# Patient Record
Sex: FEMALE | Race: WHITE | URBAN - METROPOLITAN AREA
[De-identification: names, ages, dates, MRNs, and addresses within clinical notes are randomized per-mention and may not be internally consistent; named-entity substitution may affect disease eponyms.]

---

## 2022-04-06 ENCOUNTER — WEB ENCOUNTER (OUTPATIENT)
Dept: URBAN - METROPOLITAN AREA CLINIC 105 | Facility: CLINIC | Age: 82
End: 2022-04-06

## 2022-04-06 ENCOUNTER — DASHBOARD ENCOUNTERS (OUTPATIENT)
Age: 82
End: 2022-04-06

## 2022-04-06 ENCOUNTER — OFFICE VISIT (OUTPATIENT)
Dept: URBAN - METROPOLITAN AREA CLINIC 105 | Facility: CLINIC | Age: 82
End: 2022-04-06
Payer: MEDICARE

## 2022-04-06 VITALS
BODY MASS INDEX: 20.32 KG/M2 | TEMPERATURE: 97.2 F | SYSTOLIC BLOOD PRESSURE: 180 MMHG | WEIGHT: 119 LBS | HEART RATE: 87 BPM | DIASTOLIC BLOOD PRESSURE: 76 MMHG | HEIGHT: 64 IN

## 2022-04-06 DIAGNOSIS — R19.7 DIARRHEA, UNSPECIFIED TYPE: ICD-10-CM

## 2022-04-06 PROCEDURE — 99204 OFFICE O/P NEW MOD 45 MIN: CPT | Performed by: INTERNAL MEDICINE

## 2022-04-06 NOTE — HPI-TODAY'S VISIT:
The patient presents for diarrhea  Today, the patient reports onset of diarrhea 1.5 weeks ago a few hours after eating a [employer]. She denies nausea/vomiting. She has 5-6 watery BMs QD without blood. She denies nocturnal urgency. She has abdominal pain shortly before a BM. She started on metronidazole on  without a change in symptoms. She denies recent abx use prior to taking metronidazole. She says her last colon was at least 10 years ago with Dr. Gloria - [facility].  PMH: Broke arm 1.5 years ago (surgery done), broke foot and ankle in  (surgery done), hyperlipidemia, seasonal allergies, vitamin B12 deficiency, vitamin D deficiency, neuropathy, arthritis, bunions, hammertoe PSH: tonsillectomy, eye surgery, varicose vein surgery SH: 1-2 glasses of wine QD, former tobacco user, CBD gummies FH: Mother - tongue cancer, father and brother  young, so she is unsure

## 2022-04-07 ENCOUNTER — LAB OUTSIDE AN ENCOUNTER (OUTPATIENT)
Dept: URBAN - METROPOLITAN AREA CLINIC 105 | Facility: CLINIC | Age: 82
End: 2022-04-07

## 2022-04-08 LAB
A/G RATIO: 1.9
ALBUMIN: 4.6
ALKALINE PHOSPHATASE: 77
ALT (SGPT): 13
AST (SGOT): 25
BASO (ABSOLUTE): 0.1
BASOS: 1
BILIRUBIN, TOTAL: 0.2
BUN/CREATININE RATIO: 20
BUN: 22
CALCIUM: 10.2
CARBON DIOXIDE, TOTAL: 21
CHLORIDE: 106
CREATININE: 1.11
EGFR: 49
EOS (ABSOLUTE): 0.1
EOS: 2
GLOBULIN, TOTAL: 2.4
GLUCOSE: 109
HEMATOCRIT: 38.7
HEMATOLOGY COMMENTS:: (no result)
HEMOGLOBIN: 13
IMMATURE CELLS: (no result)
IMMATURE GRANS (ABS): 0
IMMATURE GRANULOCYTES: 0
IMMUNOGLOBULIN A, QN, SERUM: 270
LYMPHS (ABSOLUTE): 1.3
LYMPHS: 25
MCH: 32
MCHC: 33.6
MCV: 95
MONOCYTES(ABSOLUTE): 0.6
MONOCYTES: 11
NEUTROPHILS (ABSOLUTE): 3.2
NEUTROPHILS: 61
NRBC: (no result)
PLATELETS: 284
POTASSIUM: 5.1
PROTEIN, TOTAL: 7
RBC: 4.06
RDW: 12.1
SODIUM: 141
T-TRANSGLUTAMINASE (TTG) IGA: <2
T4,FREE(DIRECT): 1.19
TSH: 2.47
WBC: 5.3

## 2022-04-12 ENCOUNTER — TELEPHONE ENCOUNTER (OUTPATIENT)
Dept: URBAN - METROPOLITAN AREA CLINIC 92 | Facility: CLINIC | Age: 82
End: 2022-04-12

## 2022-04-12 LAB — GASTROINTESTINAL PATHOGEN: (no result)
